# Patient Record
(demographics unavailable — no encounter records)

---

## 2025-02-22 NOTE — REVIEW OF SYSTEMS
[Joint Pain] : no joint pain [Joint Stiffness] : no joint stiffness [Muscle Pain] : muscle pain [Back Pain] : back pain [Joint Swelling] : no joint swelling [Itching] : no itching [Skin Rash] : no skin rash